# Patient Record
Sex: FEMALE | Race: WHITE | NOT HISPANIC OR LATINO | Employment: OTHER | URBAN - METROPOLITAN AREA
[De-identification: names, ages, dates, MRNs, and addresses within clinical notes are randomized per-mention and may not be internally consistent; named-entity substitution may affect disease eponyms.]

---

## 2018-04-02 ENCOUNTER — THERAPY VISIT (OUTPATIENT)
Dept: PHYSICAL THERAPY | Facility: CLINIC | Age: 38
End: 2018-04-02
Payer: COMMERCIAL

## 2018-04-02 DIAGNOSIS — M62.81 GENERALIZED MUSCLE WEAKNESS: Primary | ICD-10-CM

## 2018-04-02 DIAGNOSIS — N39.3 FEMALE STRESS INCONTINENCE: ICD-10-CM

## 2018-04-02 PROCEDURE — 97110 THERAPEUTIC EXERCISES: CPT | Mod: GP | Performed by: PHYSICAL THERAPIST

## 2018-04-02 PROCEDURE — 97161 PT EVAL LOW COMPLEX 20 MIN: CPT | Mod: GP | Performed by: PHYSICAL THERAPIST

## 2018-04-02 PROCEDURE — 97535 SELF CARE MNGMENT TRAINING: CPT | Mod: GP | Performed by: PHYSICAL THERAPIST

## 2018-04-02 NOTE — LETTER
[facility] ATHLETIC [facility]  [address]  [facility] [address]  [phone]    2018    Re: Maria Luz Marin   :   1980  MRN:  [id number]   REFERRING PHYSICIAN:   Maritza Mendez*    [facility]IC [facility]    Date of Initial Evaluation:  2018  Visits:  Rxs Used: 1  Reason for Referral:     Generalized muscle weakness  Female stress incontinence    [facility] Athletic [facility] Initial Evaluation  Subjective:    Patient is a 37 year old female presenting with rehab pelvic hpi.   Maria Luz Marin is a 37 year old female with a incontinence (stress incontinence) condition.      This is a chronic condition  Patient has chief complaint of urinary stress incontinence which started 6 years ago after the birth of her second child. She has leaking occasionally with coughing, sneezing, and jumping. No urinary frequency or urge incontinence. She has 6 and 9 year old children. No complaints of pelvic pain.No history of back or hip pain.    Pain Scale: 0/10.Symptoms are exacerbated by jumping, running, laughing, coughing and sneezing   Since onset symptoms are unchanged. General health as reported by patient is good.                          Objective:    Pelvic Dysfunction Evaluation:    Bladder/Pelvic Problems:    Storage Problem:  Stress incontinence  Diagnostic Tests:    Pelvic Exam:  Normal  UA/Urine Sample:  Normal  Flexibility:  normal  Pelvic Clock Exam:    Ischiocavernosis pain:  -  Bulbocavernosis pain:  -  Transverse Perineal:  +/-  Levator ANI:  +/-  Perineal Body:  -  SI Provocation:  normal  Reflex Testing:  normal  External Assessment:  External assessment pelvic: mild cystocele.  Skin Condition:  Normal  Bearing Down/Coughing:  Cystocele  Muscle Contraction/Perineal Mobility:  Elevation and urogential triangle descent  Internal Assessment:  Internal assessment pelvic: PFM strength=3+/5.  Contraction/Grade:  Fair squeeze, definite lift (3)      Re: Maria Luz  Tania   :   1980    Additional History:  Delivery History:    Number of Pregnancies: 2  Number of Live Births: 2  Caffeine Consumption:  None       Assessment/Plan:      Patient is a 37 year old female with pelvic complaints.    Patient has the following significant findings with corresponding treatment plan.                Diagnosis 1:  Stress incontinence  Decreased strength - therapeutic exercise, therapeutic activities and home program  Impaired muscle performance - biofeedback and neuro re-education  Decreased function - therapeutic activities and home program  Therapy Evaluation Codes:   1) History comprised of:   Personal factors that impact the plan of care:      None.    Comorbidity factors that impact the plan of care are:      None.     Medications impacting care: None.  2) Examination of Body Systems comprised of:   Body structures and functions that impact the plan of care:      Pelvis.   Activity limitations that impact the plan of care are:      Stress incontinence.  3) Clinical presentation characteristics are:   Stable/Uncomplicated.  4) Decision-Making    Low complexity using standardized patient assessment instrument and/or measureable assessment of functional outcome.  Cumulative Therapy Evaluation is: Low complexity.  Previous and current functional limitations:  (See Goal Flow Sheet for this information)    Short term and Long term goals: (See Goal Flow Sheet for this information)   Communication ability:  Patient appears to be able to clearly communicate and understand verbal and written communication and follow directions correctly.  Treatment Explanation - The following has been discussed with the patient:   RX ordered/plan of care  Anticipated outcomes  Possible risks and side effects  This patient would benefit from PT intervention to resume normal activities.   Rehab potential is excellent.  Frequency:  2 X a month, once daily  Duration:  for 2 months  Discharge Plan:   Achieve all LTG.  Independent in home treatment program.  Reach maximal therapeutic benefit.    Thank you for your referral.    INQUIRIES  Therapist: Callie Landaverde PT  [facility] [place]  [address]                                         Phone: [phone]  [facility] [address]                     Fax: [phone]

## 2018-04-02 NOTE — PROGRESS NOTES
[place] [facility] Initial Evaluation  Subjective:  Patient is a 37 year old female presenting with rehab pelvic hpi.   Maria Luz Marin is a 37 year old female with a incontinence (stress incontinence) condition.      This is a chronic condition  Patient has chief complaint of urinary stress incontinence which started 6 years ago after the birth of her second child. She has leaking occasionally with coughing, sneezing, and jumping. No urinary frequency or urge incontinence. She has 6 and 9 year old children. No complaints of pelvic pain.No history of back or hip pain.          Pain Scale: 0/10.     Symptoms are exacerbated by jumping, running, laughing, coughing and sneezing   Since onset symptoms are unchanged.        General health as reported by patient is good.                                              Objective:  System                                 Pelvic Dysfunction Evaluation:    Bladder/Pelvic Problems:    Storage Problem:  Stress incontinence        Diagnostic Tests:    Pelvic Exam:  Normal  UA/Urine Sample:  Normal                    Flexibility:  normal        Pelvic Clock Exam:    Ischiocavernosis pain:  -  Bulbocavernosis pain:  -  Transverse Perineal:  +/-  Levator ANI:  +/-  Perineal Body:  -  SI Provocation:  normal        Reflex Testing:  normal    External Assessment:  External assessment pelvic: mild cystocele.  Skin Condition:  Normal    Bearing Down/Coughing:  Cystocele      Muscle Contraction/Perineal Mobility:  Elevation and urogential triangle descent  Internal Assessment:  Internal assessment pelvic: PFM strength=3+/5.    Contraction/Grade:  Fair squeeze, definite lift (3)          Additional History:  Delivery History:    Number of Pregnancies: 2  Number of Live Births: 2  Caffeine Consumption:  None                     General     ROS    Assessment/Plan:    Patient is a 37 year old female with pelvic complaints.    Patient has the following significant findings with  corresponding treatment plan.                Diagnosis 1:  Stress incontinence  Decreased strength - therapeutic exercise, therapeutic activities and home program  Impaired muscle performance - biofeedback and neuro re-education  Decreased function - therapeutic activities and home program    Therapy Evaluation Codes:   1) History comprised of:   Personal factors that impact the plan of care:      None.    Comorbidity factors that impact the plan of care are:      None.     Medications impacting care: None.  2) Examination of Body Systems comprised of:   Body structures and functions that impact the plan of care:      Pelvis.   Activity limitations that impact the plan of care are:      Stress incontinence.  3) Clinical presentation characteristics are:   Stable/Uncomplicated.  4) Decision-Making    Low complexity using standardized patient assessment instrument and/or measureable assessment of functional outcome.  Cumulative Therapy Evaluation is: Low complexity.    Previous and current functional limitations:  (See Goal Flow Sheet for this information)    Short term and Long term goals: (See Goal Flow Sheet for this information)     Communication ability:  Patient appears to be able to clearly communicate and understand verbal and written communication and follow directions correctly.  Treatment Explanation - The following has been discussed with the patient:   RX ordered/plan of care  Anticipated outcomes  Possible risks and side effects  This patient would benefit from PT intervention to resume normal activities.   Rehab potential is excellent.    Frequency:  2 X a month, once daily  Duration:  for 2 months  Discharge Plan:  Achieve all LTG.  Independent in home treatment program.  Reach maximal therapeutic benefit.    Please refer to the daily flowsheet for treatment today, total treatment time and time spent performing 1:1 timed codes.

## 2018-04-05 ENCOUNTER — THERAPY VISIT (OUTPATIENT)
Dept: PHYSICAL THERAPY | Facility: CLINIC | Age: 38
End: 2018-04-05
Payer: COMMERCIAL

## 2018-04-05 DIAGNOSIS — M54.2 CERVICAL PAIN: Primary | ICD-10-CM

## 2018-04-05 PROCEDURE — 97110 THERAPEUTIC EXERCISES: CPT | Mod: GP | Performed by: PHYSICAL THERAPIST

## 2018-04-05 PROCEDURE — 97161 PT EVAL LOW COMPLEX 20 MIN: CPT | Mod: GP | Performed by: PHYSICAL THERAPIST

## 2018-04-05 PROCEDURE — 97112 NEUROMUSCULAR REEDUCATION: CPT | Mod: GP | Performed by: PHYSICAL THERAPIST
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
## 2018-04-09 ENCOUNTER — THERAPY VISIT (OUTPATIENT)
Dept: PHYSICAL THERAPY | Facility: CLINIC | Age: 38
End: 2018-04-09
Payer: COMMERCIAL

## 2018-04-09 DIAGNOSIS — M54.2 CERVICAL PAIN: ICD-10-CM

## 2018-04-09 PROCEDURE — 97112 NEUROMUSCULAR REEDUCATION: CPT | Mod: GP | Performed by: PHYSICAL THERAPIST

## 2018-04-09 PROCEDURE — 97110 THERAPEUTIC EXERCISES: CPT | Mod: GP | Performed by: PHYSICAL THERAPIST

## 2018-04-09 PROCEDURE — 97140 MANUAL THERAPY 1/> REGIONS: CPT | Mod: GP | Performed by: PHYSICAL THERAPIST

## 2018-04-16 ENCOUNTER — THERAPY VISIT (OUTPATIENT)
Dept: PHYSICAL THERAPY | Facility: CLINIC | Age: 38
End: 2018-04-16
Payer: COMMERCIAL

## 2018-04-16 DIAGNOSIS — M54.2 CERVICAL PAIN: ICD-10-CM

## 2018-04-16 PROCEDURE — 97110 THERAPEUTIC EXERCISES: CPT | Mod: GP | Performed by: PHYSICAL THERAPIST

## 2018-04-16 PROCEDURE — 97140 MANUAL THERAPY 1/> REGIONS: CPT | Mod: GP | Performed by: PHYSICAL THERAPIST

## 2018-04-16 PROCEDURE — 97112 NEUROMUSCULAR REEDUCATION: CPT | Mod: GP | Performed by: PHYSICAL THERAPIST

## 2018-04-24 ENCOUNTER — THERAPY VISIT (OUTPATIENT)
Dept: PHYSICAL THERAPY | Facility: CLINIC | Age: 38
End: 2018-04-24
Payer: COMMERCIAL

## 2018-04-24 DIAGNOSIS — M54.2 CERVICAL PAIN: ICD-10-CM

## 2018-04-24 PROCEDURE — 97112 NEUROMUSCULAR REEDUCATION: CPT | Mod: GP | Performed by: PHYSICAL THERAPIST

## 2018-04-24 PROCEDURE — 97110 THERAPEUTIC EXERCISES: CPT | Mod: GP | Performed by: PHYSICAL THERAPIST

## 2018-04-24 PROCEDURE — 97140 MANUAL THERAPY 1/> REGIONS: CPT | Mod: GP | Performed by: PHYSICAL THERAPIST

## 2018-05-01 ENCOUNTER — THERAPY VISIT (OUTPATIENT)
Dept: PHYSICAL THERAPY | Facility: CLINIC | Age: 38
End: 2018-05-01
Payer: COMMERCIAL

## 2018-05-01 DIAGNOSIS — M54.2 CERVICAL PAIN: ICD-10-CM

## 2018-05-01 PROCEDURE — 97140 MANUAL THERAPY 1/> REGIONS: CPT | Mod: GP | Performed by: PHYSICAL THERAPIST

## 2018-05-01 PROCEDURE — 97110 THERAPEUTIC EXERCISES: CPT | Mod: GP | Performed by: PHYSICAL THERAPIST

## 2018-05-08 ENCOUNTER — THERAPY VISIT (OUTPATIENT)
Dept: PHYSICAL THERAPY | Facility: CLINIC | Age: 38
End: 2018-05-08
Payer: COMMERCIAL

## 2018-05-08 DIAGNOSIS — M54.2 CERVICAL PAIN: ICD-10-CM

## 2018-05-08 PROCEDURE — 97140 MANUAL THERAPY 1/> REGIONS: CPT | Mod: GP | Performed by: PHYSICAL THERAPIST

## 2018-05-08 NOTE — LETTER
[facility] ATHLETIC [facility]  [address] [place] [address] [phone]    2018    Re: Maria Luz Marin   :   1980  MRN:  [id number]   REFERRING PHYSICIAN:   Maritza Mendez*    [facility] ATHLETIC [facility]  Date of Initial Evaluation:  2018  Visits:  Rxs Used: 6  Reason for Referral:  Cervical pain     DISCHARGE REPORT  Progress reporting period is from 2018 to 2018.     SUBJECTIVE  Subjective: Patient has a headache today, but upper back and neck have been feeling much better   Current Pain level: 1/10   Initial Pain level: 2/10 (up to 6/10 PL)   Changes in function: Yes, see goal flow sheet for change in function   Adverse reactions: None    OBJECTIVE  Objective: Cervical flexor strength=4-/5. Moderate tightness in upper trapezius and levator scapula muscles. Decreased tightness in upper back and neck. Focused more on craniosacral techniques for HA today and tolerated well      ASSESSMENT/PLAN    STG/LTGs have been met or progress has been made towards goals:  Yes (See Goal flow sheet completed today.)  Assessment of Progress: The patient's condition is improving.  The patient's condition has potential to improve.  Self Management Plans:  Patient is independent in a home treatment program.  Patient is independent in self management of symptoms.  Maria Luz continues to require the following intervention to meet STG and LTG's: PT intervention is no longer required to meet STG/LTG.    Recommendations:  This patient is ready to be discharged from therapy and continue their home treatment program.            Re: Maria Luz Marin   :   1980                                      Thank you for your referral.    INQUIRIES  Therapist: Callie Landaverde, PT  [facility] ATHLETIC [facility]  [address] [facility] [address]  Phone: [phone]  Fax: [phone]

## 2018-08-02 PROBLEM — N39.3 FEMALE STRESS INCONTINENCE: Status: RESOLVED | Noted: 2018-04-02 | Resolved: 2018-08-02

## 2018-08-02 PROBLEM — M54.2 CERVICAL PAIN: Status: RESOLVED | Noted: 2018-04-05 | Resolved: 2018-08-02

## 2018-08-02 PROBLEM — M62.81 GENERALIZED MUSCLE WEAKNESS: Status: RESOLVED | Noted: 2018-04-02 | Resolved: 2018-08-02

## 2018-08-02 NOTE — PROGRESS NOTES
Subjective:  HPI                    Objective:  System    Physical Exam    General     ROS    Assessment/Plan:    DISCHARGE REPORT    Progress reporting period is from 4/5/2018 to 5/8/2018.     SUBJECTIVE    Subjective: Patient has a headache today, but upper back and neck have been feeling much better   Current Pain level: 1/10   Initial Pain level: 2/10 (up to 6/10 PL)   Changes in function: Yes, see goal flow sheet for change in function   Adverse reactions: None    OBJECTIVE    Objective: Cervical flexor strength=4-/5. Moderate tightness in upper trapezius and levator scapula muscles. Decreased tightness in upper back and neck. Focused more on craniosacral techniques for HA today and tolerated well      ASSESSMENT/PLAN    STG/LTGs have been met or progress has been made towards goals:  Yes (See Goal flow sheet completed today.)  Assessment of Progress: The patient's condition is improving.  The patient's condition has potential to improve.  Self Management Plans:  Patient is independent in a home treatment program.  Patient is independent in self management of symptoms.    Maria Luz continues to require the following intervention to meet STG and LTG's: PT intervention is no longer required to meet STG/LTG.      Recommendations:    This patient is ready to be discharged from therapy and continue their home treatment program.    Please refer to the daily flowsheet for treatment today, total treatment time and time spent performing 1:1 timed codes.

## 2020-09-10 ENCOUNTER — TRANSFERRED RECORDS (OUTPATIENT)
Dept: HEALTH INFORMATION MANAGEMENT | Facility: CLINIC | Age: 40
End: 2020-09-10

## 2021-04-28 ENCOUNTER — TELEPHONE (OUTPATIENT)
Dept: OTHER | Facility: CLINIC | Age: 41
End: 2021-04-28

## 2021-04-28 ENCOUNTER — MEDICAL CORRESPONDENCE (OUTPATIENT)
Dept: HEALTH INFORMATION MANAGEMENT | Facility: CLINIC | Age: 41
End: 2021-04-28

## 2021-04-28 NOTE — TELEPHONE ENCOUNTER
[facility]    Who is the name of the provider?:  New      What is the location you see this provider at?: [place]    Reason for call:  Referred by friend to Dr. Castillo for lipidemia.  Will have PCP fax office notes to [facility].    Can we leave a detailed message on this number?  YES

## 2021-04-28 NOTE — TELEPHONE ENCOUNTER
Per review of chart and care everywhere there is no recent imaging or labs.  Will await referral notes to review and complete referral.     Leticia BAZZIN, RN    [facility]  [facility]  Office: [phone]  Fax: [phone]

## 2021-04-29 NOTE — TELEPHONE ENCOUNTER
Pt referred to [facility] by Marion Rosario NP via fax from [facility] [phone] for lipedema    Pt needs to be scheduled for in-person consult with Dr. Castillo per patient request. Will route to scheduling to coordinate an appointment at next available.    Leticia BAZZIN, RN    [facility]  Office: [phone]  Fax: [phone]

## 2021-05-20 ENCOUNTER — OFFICE VISIT (OUTPATIENT)
Dept: OTHER | Facility: CLINIC | Age: 41
End: 2021-05-20
Attending: INTERNAL MEDICINE
Payer: COMMERCIAL

## 2021-05-20 VITALS
WEIGHT: 214 LBS | OXYGEN SATURATION: 99 % | RESPIRATION RATE: 18 BRPM | BODY MASS INDEX: 34.39 KG/M2 | DIASTOLIC BLOOD PRESSURE: 73 MMHG | SYSTOLIC BLOOD PRESSURE: 110 MMHG | HEART RATE: 77 BPM | HEIGHT: 66 IN

## 2021-05-20 DIAGNOSIS — I83.893 VARICOSE VEINS OF BILATERAL LOWER EXTREMITIES WITH OTHER COMPLICATIONS: ICD-10-CM

## 2021-05-20 DIAGNOSIS — E66.811 CLASS 1 OBESITY WITHOUT SERIOUS COMORBIDITY WITH BODY MASS INDEX (BMI) OF 34.0 TO 34.9 IN ADULT, UNSPECIFIED OBESITY TYPE: ICD-10-CM

## 2021-05-20 DIAGNOSIS — R06.83 SNORING: ICD-10-CM

## 2021-05-20 DIAGNOSIS — R60.9 LIPEDEMA: Primary | ICD-10-CM

## 2021-05-20 PROCEDURE — 99205 OFFICE O/P NEW HI 60 MIN: CPT | Performed by: INTERNAL MEDICINE

## 2021-05-20 PROCEDURE — G0463 HOSPITAL OUTPT CLINIC VISIT: HCPCS

## 2021-05-20 RX ORDER — BUDESONIDE 1 MG/2ML
INHALANT ORAL
COMMUNITY
Start: 2020-06-03

## 2021-05-20 RX ORDER — MULTIPLE VITAMINS W/ MINERALS TAB 9MG-400MCG
1 TAB ORAL DAILY
COMMUNITY

## 2021-05-20 RX ORDER — ERGOCALCIFEROL (VITAMIN D2) 10 MCG
TABLET ORAL
COMMUNITY

## 2021-05-20 RX ORDER — TOPIRAMATE 100 MG/1
TABLET, FILM COATED ORAL
COMMUNITY
Start: 2020-08-29

## 2021-05-20 RX ORDER — ETONOGESTREL 68 MG/1
1 IMPLANT SUBCUTANEOUS
COMMUNITY

## 2021-05-20 RX ORDER — BUPROPION HYDROCHLORIDE 300 MG/1
300 TABLET ORAL DAILY
COMMUNITY
Start: 2021-02-25

## 2021-05-20 RX ORDER — FEXOFENADINE HCL 180 MG/1
180 TABLET ORAL
COMMUNITY

## 2021-05-20 RX ORDER — ALBUTEROL SULFATE 90 UG/1
AEROSOL, METERED RESPIRATORY (INHALATION)
COMMUNITY
Start: 2020-03-30

## 2021-05-20 SDOH — HEALTH STABILITY: MENTAL HEALTH: HOW MANY STANDARD DRINKS CONTAINING ALCOHOL DO YOU HAVE ON A TYPICAL DAY?: NOT ASKED

## 2021-05-20 SDOH — HEALTH STABILITY: MENTAL HEALTH: HOW OFTEN DO YOU HAVE A DRINK CONTAINING ALCOHOL?: NOT ASKED

## 2021-05-20 SDOH — HEALTH STABILITY: MENTAL HEALTH: HOW OFTEN DO YOU HAVE 6 OR MORE DRINKS ON ONE OCCASION?: NOT ASKED

## 2021-05-20 ASSESSMENT — MIFFLIN-ST. JEOR: SCORE: 1652.45

## 2021-05-20 NOTE — PATIENT INSTRUCTIONS
1. Please see edema therapist order placed for MLD, compression etc    2. Lose weight, yoga, meditation ,  pool exercises     3. Anti inflammatory diet    4. Selenium supplement 55 mcg daily     5. Discuss with primary and go for sleep study, echo, thyroid labs     6. Follow up in 6 months.
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
## 2021-05-20 NOTE — PROGRESS NOTES
[facility] INITIAL VASCULAR MEDICINE CONSULT    ( NEW PATIENT VISIT)    PRIMARY HEALTH CARE PROVIDER:  Maritza Capone CNP       REFERRING HEALTH CARE PROVIDER;  Maritza Capone CNP       REASON FOR CONSULT: Evaluation management of lipedema and bilateral lower extremity varicose veins .   Ongoing pain in both lower extremities, she is obese with history of a snoring daytime somnolence and fatigue tiredness.      HPI: Maria Luz Marin is a 40 year old very pleasant female with past medical history of obesity BMI greater than 34, history of asthma, depression, generalized anxiety disorder after puberty she became obese with leg swelling and pain discomfort for many years.  Her mother also similar picture.  She joined Facebook group and found herself lipedema and here today for further evaluation.  She does snore with daytime somnolence fatigue and tiredness.  No previous sleep evaluation.  Developed bilateral lower extremity varicose veins after childbirth and no previous intervention.  She tried multiple things for weight loss enrolled in weight [employer] and tried some medications in the past without any luck  She has a subcutaneous hormonal implantation for birth control, estrogen product.  She denies any chest pain, shortness of breath or palpitations  She is new to this clinic  She never smoked.  Rarely drinks alcohol  Reviewed available records in the [facility] Care Everywhere and updated chart        PAST MEDICAL HISTORY  Obesity  Asthma  Depression   CURRENT MEDICATIONS  albuterol (PROAIR HFA/PROVENTIL HFA/VENTOLIN HFA) 108 (90 Base) MCG/ACT inhaler, INHALE 1 2 PUFFS EVERY 4 6 HOURS AS NEEDED FOR WHEEZING OR COUGH.  budesonide (PULMICORT) 1 MG/2ML neb solution, INHALE 1 VIAL (2ML) VIA NEBULIZER TWICE DAILY AS NEEDED  buPROPion (WELLBUTRIN XL) 300 MG 24 hr tablet, Take 300 mg by mouth daily  etonogestrel (NEXPLANON) 68 MG IMPL, Inject 1 Device  Subcutaneous  fexofenadine (ALLEGRA) 180 MG tablet, Take 180 mg by mouth  FLUoxetine (PROZAC) 20 MG capsule, Take 20 mg by mouth  multivitamin w/minerals (MULTI-VITAMIN) tablet, Take 1 tablet by mouth daily  topiramate (TOPAMAX) 100 MG tablet,   Vitamin D, Cholecalciferol, 10 MCG (400 UNIT) TABS,     No current facility-administered medications on file prior to visit.       PAST SURGICAL HISTORY:  History reviewed. No pertinent surgical history.    ALLERGIES     Allergies   Allergen Reactions     Hydrocodone-Acetaminophen Nausea and Vomiting     nausea         Sulfa Drugs      nausea           FAMILY HISTORY   mother with ? HX of lipedema    VASCULAR FAMILY HISTORY  1st order relative with atherosclerotic PAD: No  1st order relative with AAA: No  Family history of Familial Hyperlipidemia No  Family History of Hypercoagulable state:No    VASCULAR RISK FACTORS  1. Diabetes:No   2. Smoking: has never smoked.  3. HTN: normotensive  4.Hyperlipidemia: No      SOCIAL HISTORY  Social History     Socioeconomic History     Marital status:      Spouse name: Not on file     Number of children: Not on file     Years of education: Not on file     Highest education level: Not on file   Occupational History     Not on file   Social Needs     Financial resource strain: Not on file     Food insecurity     Worry: Not on file     Inability: Not on file     Transportation needs     Medical: Not on file     Non-medical: Not on file   Tobacco Use     Smoking status: Never Smoker     Smokeless tobacco: Never Used   Substance and Sexual Activity     Alcohol use: Not Currently     Drug use: Not on file     Sexual activity: Not on file   Lifestyle     Physical activity     Days per week: Not on file     Minutes per session: Not on file     Stress: Not on file   Relationships     Social connections     Talks on phone: Not on file     Gets together: Not on file     Attends Buddhism service: Not on file     Active member of club or  "organization: Not on file     Attends meetings of clubs or organizations: Not on file     Relationship status: Not on file     Intimate partner violence     Fear of current or ex partner: Not on file     Emotionally abused: Not on file     Physically abused: Not on file     Forced sexual activity: Not on file   Other Topics Concern     Not on file   Social History Narrative     Not on file       ROS:   General: No change in weight, or appetite.  Normal energy.  No fever or chills  Snoring with daytime somnolence fatigue and tiredness  Eyes: Negative for vision changes or eye problems  ENT: No problems with ears, nose or throat.  No difficulty swallowing.  Resp: No coughing, wheezing or shortness of breath  CV: No chest pains or palpitations  GI: No nausea, vomiting,  heartburn, abdominal pain, diarrhea, constipation or change in bowel habits  : No urinary frequency or dysuria, bladder or kidney problems  Musculoskeletal: No significant muscle or joint pains  Neurologic: No headaches, numbness, tingling, weakness, problems with balance or coordination  Psychiatric: No problems with anxiety, depression or mental health  Heme/immune/allergy: No history of bleeding or clotting problems or anemia.  No allergies or immune system problems  Endocrine: No history of thyroid disease, diabetes or other endocrine disorders  Skin: No rashes,worrisome lesions or skin problems  Vascular: Bilateral lower extremity swelling since puberty, no history of DVT or PE.  Overweight.  Pain discomfort and heaviness in the legs.  Bilateral lower extremity varicose veins developed after childbirth.  No previous intervention  No foot ulcers or leg ulcers    EXAM:  /73 (BP Location: Left arm, Patient Position: Chair, Cuff Size: Adult Large)   Pulse 77   Resp 18   Ht 5' 6\" (1.676 m)   Wt 214 lb (97.1 kg)   LMP 04/27/2021   SpO2 99%   Breastfeeding No   BMI 34.54 kg/m    In general, the patient is a pleasant female in no apparent " distress.    HEENT: NC/AT.  PERRLA.  EOMI.  Sclerae white, not injected.  Nares clear.  Pharynx without erythema or exudate.  Dentition intact.    Neck: No adenopathy.  No thyromegaly. Carotids +2/2 bilaterally without bruits.  No jugular venous distension.   Heart: RRR. Normal S1, S2 splits physiologically. No murmur, rub, click, or gallop. The PMI is in the 5th ICS in the midclavicular line. There is no heave.    Lungs: CTA.  No ronchi, wheezes, rales.  No dullness to percussion.   Abdomen: Soft, nontender, nondistended. No organomegaly. No AAA.  No bruits.   Extremities: Vascular  Bilateral lower extremity varicose veins right leg more than left leg CEAP 1-2 CVI  Classical features of lipedema, symmetrical legs, symmetrical arms,   Palpable symmetrical bilateral peripheral pulses in upper extremities and lower extremities  No foot ulcers or leg ulcers      Labs:    Procedures:           Assessment and Plan:     1. Lipedema, stage 2  ( lipo-phlebo-edema)   2. Varicose veins of bilateral lower extremities with other complications CEAP 1-2 CVI Rt worse than left side   3. Class 1 obesity without serious comorbidity with body mass index (BMI) of 34.0 to 34.9 in adult, unspecified obesity type  4. Snoring highly suspicious for KELLI.        Very pleasant 40-year-old female has a classical features of lipedema stage IIb with typical symptoms of pain discomfort and her swelling stopped at the ankle level and she has a history of varicose veins with mild venous insufficiency.  She is also obese with BMI greater than 34 previously tried multiple programs and medications no success.  No foot ulcers or leg ulcers  She also gives a history of a snoring and unsure about apneic episodes but she gives a history of daytime somnolence fatigue tiredness  History of depression and anxiety taking medications  Works as a   No history of smoking or alcohol use.  We discussed lipedema causes (unknown and runs in the family's)  its progression, inflammation and lymphatic flow problems etc. with the patient.  Education sheet given, reviewed stages and various modalities of treatment on the website    Focusing on managing the pain, antiinflammatory diet, weight loss, yoga, meditation, pool exercises, muscle toning, improve lymphatic flow with compression, MLD etc.  She also has a venous insufficiency with possible evolving lymphedema    Arrange referral to see lymphedema therapist for MLD, compression garments etc.  Selenium supplementation 55 mcg daily  Discuss with primary and go for formal sleep study  Check thyroid function test through primary MD office  Go for echocardiogram to look for right-sided pressures  Will plan for venous competency studies at some point  Decrease calorie intake and increase activity and lose 2 to 3 pounds per month    - LYMPHEDEMA THERAPY REFERRAL; Future      62  minutes spent on the date of the encounter doing chart review, history and exam, documentation, and further activities as noted above. She is new to this clinic.    Krystin Castillo MD, FAHA,FSVM,FASH,FNLA  Vascular Medicine  Clinical Hypertension specialist  Clinical Lipidologist

## 2021-05-27 ENCOUNTER — TELEPHONE (OUTPATIENT)
Dept: OTHER | Facility: CLINIC | Age: 41
End: 2021-05-27

## 2021-06-01 ENCOUNTER — IMMUNIZATION (OUTPATIENT)
Dept: NURSING | Facility: CLINIC | Age: 41
End: 2021-06-01
Payer: COMMERCIAL

## 2021-06-01 PROCEDURE — 91300 PR COVID VAC PFIZER DIL RECON 30 MCG/0.3 ML IM: CPT

## 2021-06-01 PROCEDURE — 0001A PR COVID VAC PFIZER DIL RECON 30 MCG/0.3 ML IM: CPT

## 2021-06-19 ENCOUNTER — HEALTH MAINTENANCE LETTER (OUTPATIENT)
Age: 41
End: 2021-06-19

## 2021-06-22 ENCOUNTER — IMMUNIZATION (OUTPATIENT)
Dept: NURSING | Facility: CLINIC | Age: 41
End: 2021-06-22
Attending: INTERNAL MEDICINE
Payer: COMMERCIAL

## 2021-06-22 PROCEDURE — 91300 PR COVID VAC PFIZER DIL RECON 30 MCG/0.3 ML IM: CPT

## 2021-06-22 PROCEDURE — 0002A PR COVID VAC PFIZER DIL RECON 30 MCG/0.3 ML IM: CPT

## 2021-07-07 ENCOUNTER — TELEPHONE (OUTPATIENT)
Dept: OTHER | Facility: CLINIC | Age: 41
End: 2021-07-07

## 2021-07-07 NOTE — TELEPHONE ENCOUNTER
Patient states at her last OV 5/20/2021, Dr. Castillo has recommend for her to contact her PCP to order sleep study and echo. She states after reviewing with her PCP and insurance, they feel that sleep study would most likely be covered by insurance if it is with [employer]. Patient requesting for Dr. Castillo to put in orders for both.         Informed will send message to nurse to review and advise. Will call back when instructions are given.       Jessica JEAN BAPTISTE

## 2021-07-07 NOTE — TELEPHONE ENCOUNTER
Called patient to let her know that her primary care provider can refer her to [place] for the echocardiogram and for the sleep study.    Maria Luz will contact her PCP.    Elvi Hamm RN BSN  [facility]  [phone]

## 2021-10-10 ENCOUNTER — HEALTH MAINTENANCE LETTER (OUTPATIENT)
Age: 41
End: 2021-10-10

## 2022-07-16 ENCOUNTER — HEALTH MAINTENANCE LETTER (OUTPATIENT)
Age: 42
End: 2022-07-16

## 2022-09-18 ENCOUNTER — HEALTH MAINTENANCE LETTER (OUTPATIENT)
Age: 42
End: 2022-09-18

## 2023-07-30 ENCOUNTER — HEALTH MAINTENANCE LETTER (OUTPATIENT)
Age: 43
End: 2023-07-30

## 2024-02-25 ENCOUNTER — HEALTH MAINTENANCE LETTER (OUTPATIENT)
Age: 44
End: 2024-02-25

## 2024-11-20 ENCOUNTER — TRANSFERRED RECORDS (OUTPATIENT)
Dept: HEALTH INFORMATION MANAGEMENT | Facility: CLINIC | Age: 44
End: 2024-11-20
Payer: COMMERCIAL

## 2024-11-21 ENCOUNTER — MEDICAL CORRESPONDENCE (OUTPATIENT)
Dept: HEALTH INFORMATION MANAGEMENT | Facility: CLINIC | Age: 44
End: 2024-11-21
Payer: COMMERCIAL